# Patient Record
(demographics unavailable — no encounter records)

---

## 2017-08-22 NOTE — DIAGNOSTIC IMAGING REPORT
Indications:  Right ankle trauma, pain



Technique: 3 views right ankle.



Findings:



Comparison: None



Tiny osseous density adjacent to proximal dorsal margin of navicular on lateral

view. No additional Fracture, dislocation, joint space     widening     ,

surrounding soft tissue swelling/foreign body/gas, or other acute changes are

identified. Spurs emanate from the plantar and posterior aspects of calcaneus.



IMPRESSION:



Periventricular tiny osseous density, nonspecific, may represent small avulsion

fracture, acuity indeterminate, or small spur



Otherwise no evidence of acute ankle injury



Calcaneal enthesophytes.

## 2017-08-22 NOTE — EMERGENCY ROOM REPORT
History of Present Illness


General


Chief Complaint:  Lower Extremity Injury


Source:  Patient





Present Illness


HPI


Patient presents with complaints of right ankle pain


She reports that just prior to arrival while walking down stairs at home she 

missed 2 steps and essentially injured her right ankle the pain radiates 

upwards towards the knee


Patient reports that she has'knee problems'from previous however does not feel 

much exacerbation of that area


He mainly complains of the ankle pain


7/10


Mainly on the lateral aspect


Worse with pressure


Allergies:  


Coded Allergies:  


     IBUPROFEN (Unverified  Allergy, Mild, Itching, 7/15/15)


 gi discomfort





Patient History


Past Medical History:  see triage record


Pertinent Family History:  none


Pregnant Now:  No


Reviewed Nursing Documentation:  PMH: Agreed, PSxH: Agreed





Nursing Documentation-PMH


Past Medical History:  No Stated History





Review of Systems


All Other Systems:  negative except mentioned in HPI





Physical Exam





Vital Signs








  Date Time  Temp Pulse Resp B/P Pulse Ox O2 Delivery O2 Flow Rate FiO2


 


8/22/17 14:58 98.1 92 16 120/80 98 Room Air  








Sp02 EP Interpretation:  reviewed, normal


General Appearance:  well appearing, no apparent distress


Head:  normocephalic, atraumatic


Eyes:  bilateral eye EOMI, bilateral eye PERRL


ENT:  normal pharynx


Neck:  supple


Respiratory:  lungs clear


Musculoskeletal:  swelling - To the lateral ankle tender on palpation of the 

malleoli region, knee exam is fairly benign, she has some minimal discomfort on 

palpation laterally, however no obvious effusions ,


Neurologic:  alert, oriented x3, responsive


Skin:  other - as above


Lymphatic:  no adenopathy





Medical Decision Making


Diagnostic Impression:  


 Primary Impression:  


 Ankle sprain


ER Course


Given the history exam and presentation


Patient had imaging studies of pain and no obvious signs of fracture however 

given the discomfort and the swelling a splint was applied





And the patient was provided crutches and will have close outpatient followup


Other X-Ray Diagnostic Results


Other X-Ray Diagnostic Results :  


   # of Views/Limited Vs Complete:  4 View - right ankle


   Indication:  Pain


   EP Interpretation:  Yes


   Interpretation:  no dislocation, no fractures, other - mild soft tissue 

swelling


   Impression:  Other - Soft tissue swelling, no acute fracture


   Interpreting ER Provider:  Thomas Neal DO





Last Vital Signs








  Date Time  Temp Pulse Resp B/P Pulse Ox O2 Delivery O2 Flow Rate FiO2


 


8/22/17 14:58 98.1 92 16 120/80 98 Room Air  








Status:  improved


Disposition:  HOME, SELF-CARE


Condition:  Improved





Additional Instructions:  


Patient is provided with the discharge instructions notified to follow up with 

primary doctor in the next 2-3 days otherwise return to the er with any 

worsening symptoms.


Please note that this report is being documented using DRAGON technology.  This 

can lead to erroneous entry secondary to incorrect interpretation by the 

dictating instrument.











THOMAS NEAL D.O. Aug 22, 2017 15:06

## 2017-10-02 NOTE — EMERGENCY ROOM REPORT
History of Present Illness


General


Chief Complaint:  Skin Rash/Abscess


Source:  Patient





Present Illness


HPI


Is a 29-year-old female with no significant past medical history.  Patient 

presents with a bite to the right wrist.  She was in [place] 2 days ago.

  She woke up in a hotel room with swelling to her right arm.  He was red.  

Unknown bite because she did not see it.  She went to an ER there and had blood 

work are was negative.  Ultrasound was negative.  She was prescribed 

clindamycin.  She did not take any Benadryl.  Complaining of itchiness.  

Swelling down some.  She came here because is not resolved.  Denies any other 

complaint.  No trauma.  No fever chills been no nausea no vomiting.  Worse with 

itching.


Allergies:  


Coded Allergies:  


     No Known Allergies (Unverified , 10/2/17)





Patient History


Past Medical History:  see triage record


Past Surgical History:  other


Pertinent Family History:  none


Social History:  Denies: smoking


Last Menstrual Period:  a month ago


Pregnant Now:  No


Immunizations:  other


Reviewed Nursing Documentation:  PMH: Agreed, PSxH: Agreed





Review of Systems


Eye:  Denies: eye pain, blurred vision


ENT:  Denies: ear pain, nose congestion, throat swelling


Respiratory:  Denies: cough, shortness of breath


Cardiovascular:  Denies: chest pain, palpitations


Gastrointestinal:  Denies: abdominal pain, diarrhea, nausea, vomiting


Musculoskeletal:  Denies: back pain, joint pain


Skin:  Denies: rash


Neurological:  Denies: headache, numbness


Endocrine:  Denies: increased thirst, increased urine


Hematologic/Lymphatic:  Denies: easy bruising


All Other Systems:  negative except mentioned in HPI





Physical Exam





Vital Signs








  Date Time  Temp Pulse Resp B/P (MAP) Pulse Ox O2 Delivery O2 Flow Rate FiO2


 


10/2/17 21:50 98.2 101 20 118/73 98 Room Air  





vitals unremarkable


Sp02 EP Interpretation:  reviewed, normal


General Appearance:  well appearing, no apparent distress, alert


Head:  normocephalic, atraumatic


Eyes:  bilateral eye PERRL, bilateral eye EOMI


ENT:  hearing grossly normal, normal pharynx


Neck:  full range of motion, supple, no meningismus


Respiratory:  chest non-tender, lungs clear, normal breath sounds


Cardiovascular #1:  regular rate, rhythm, no murmur


Gastrointestinal:  normal bowel sounds, non tender, no mass, no organomegaly, 

no bruit, non-distended


Musculoskeletal:  back normal, gait/station normal, normal range of motion, 

other - Right forearm: There look like a small puncture jude on the dorsal 

aspect of the formula the wrist.  There is edema and mild redness to the dorsum 

of the hand.  There is ecchymosis to the volar aspect.  Compartments soft.  

Range of motion the wrist.  Radial pulse 2+.  Full range of motion the elbow.


Neurologic:  alert, oriented x3


Psychiatric:  mood/affect normal


Skin:  warm/dry





Medical Decision Making


Diagnostic Impression:  


 Primary Impression:  


 Venomous bite/sting NEC


ER Course


Patient with local reaction secondary to some type of insect bite.  Local 

reaction.  No evidence of any sepsis or infection.  She is currently taking 

clindamycin here will add Benadryl.  She had ultrasound done which was 

negative.  I review her blood work was also negative.





Last Vital Signs








  Date Time  Temp Pulse Resp B/P (MAP) Pulse Ox O2 Delivery O2 Flow Rate FiO2


 


10/2/17 21:50 98.2 101 20 118/73 98 Room Air  








Status:  unchanged


Disposition:  HOME, SELF-CARE


Condition:  Stable


Scripts


Diphenhydramine Hcl* (BENADRYL*) 25 Mg Capsule


50 MG ORAL Q6H Y for Itching, #30 CAP


   Prov: CHRISTIANO VALENZUELA M.D.         10/2/17





Additional Instructions:  


Elevating arm.  Ice pack to the area.  Followup with your Dr. in 3-5 days for 

recheck.  Return if worse.











CHRISTIANO VALENZUELA M.D. Oct 2, 2017 22:45

## 2018-03-25 NOTE — EMERGENCY ROOM REPORT
History of Present Illness


General


Chief Complaint:  Upper Respiratory Illness


Source:  Patient


 (Jamia Osorio)





Present Illness


HPI


The patient presents with complaints of productive cough, fevers, chills since 

Thursday last week.  Patient states that she feels her symptoms have gotten 

worse she now reports body aches and generally feeling ill.  Patient denies ear 

pain, sore throat, nasal congestion.  Patient reports intermittent runny nose 

states she has taken DayQuil, NyQuil, emergency, she even had a few ampicillin 

that she had left over.  Patient is worried she thinks that she may have gotten 

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 

habitually in a walk-in refrigerator/freezer.  Patient reports a few ill 

contacts at work. but nobody whom works in close proximity with her.  The 

patient reports persistent coughing, feeling short of breath, feeling as though 

it is difficult for her to take a full breath.  Patient denies swelling in the 

lower extremities, recent travel or taking hormonal birth control.  She denies 

claudication. She denies history of asthma however she does report that she 

smokes marijuana twice daily for several years. Pt. reports hx of non-bloody 

diarrhea yesterday. Denies CP, Palpitations, LOC, AMS, dizziness, Changes in 

Vision, Sensation, paresthesias, or a sudden severe headache.


 (Jamia Osorio)


Allergies:  


Coded Allergies:  


     No Known Allergies (Unverified , 10/2/17)





Patient History


Past Medical History:  see triage record


Past Surgical History:  none


Pertinent Family History:  none


Social History:  Reports: smoking - THC, drug use - THC


Last Menstrual Period:  3/11/18


Pregnant Now:  No


:  0


Immunizations:  UTD


Reviewed Nursing Documentation:  PMH: Agreed; PSxH: Agreed (Jamia Osorio)





Nursing Documentation-PMH


Past Medical History:  No History, Except For


 (Jamia Osorio)





Review of Systems


All Other Systems:  negative except mentioned in HPI


 (Jamia Osorio)





Physical Exam





Vital Signs








  Date Time  Temp Pulse Resp B/P (MAP) Pulse Ox O2 Delivery O2 Flow Rate FiO2


 


3/25/18 15:18 99.0 105 20 134/85 95 Room Air  





 99.0       








Sp02 EP Interpretation:  reviewed, normal


General Appearance:  no apparent distress, alert, GCS 15, non-toxic


Head:  normocephalic, atraumatic


ENT:  hearing grossly normal, normal pharynx, normal voice, TMs + canals normal

, uvula midline


Neck:  full range of motion


Respiratory:  lungs clear, normal breath sounds, speaking full sentences, 

wheezing


Cardiovascular #1:  regular rate, rhythm, no edema, normal capillary refill


Gastrointestinal:  normal bowel sounds, non tender, soft


Rectal:  deferred


Genitourinary:  normal inspection


Musculoskeletal:  back normal, gait/station normal, normal range of motion, non-

tender


Neurologic:  alert, oriented x3, responsive, motor strength/tone normal, 

sensory intact, normal gait, speech normal, grossly normal


Psychiatric:  judgement/insight normal


Skin:  normal color, no rash, warm/dry, well hydrated


Lymphatic:  no adenopathy


 (Jamia OsorioAGema)





Medical Decision Making


PA Attestation


Dr. Ching is my supervising Physician whom patient management has been 

discussed with. 


 (Jamia Osorio PGemaAGema)


Diagnostic Impression:  


 Primary Impression:  


 Atypical pneumonia


ER Course


The patient presents with complaints of productive cough, fevers, chills since 

Thursday last week.  Patient states that she feels her symptoms have gotten 

worse she now reports body aches and generally feeling ill.  Patient denies ear 

pain, sore throat, nasal congestion.  Patient reports intermittent runny nose 

states she has taken DayQuil, NyQuil, emergency, she even had a few ampicillin 

that she had left over.  Patient is worried she thinks that she may have gotten 

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 

habitually in a walk-in refrigerator/freezer.  Patient reports a few ill 

contacts at work. but nobody whom works in close proximity with her.  The 

patient reports persistent coughing, feeling short of breath, feeling as though 

it is difficult for her to take a full breath.  Patient denies swelling in the 

lower extremities, recent travel or taking hormonal birth control.  She denies 

claudication. She denies history of asthma however she does report that she 

smokes marijuana twice daily for several years. Pt. reports hx of non-bloody 

diarrhea yesterday. Denies CP, Palpitations, LOC, AMS, dizziness, Changes in 

Vision, Sensation, paresthesias, or a sudden severe headache.





Ddx considered but are not limited to URI, pneumonia, PE, strep pharyngitis, 

meningitis, bronchitis, legionnaire's.





Vital signs: Tachycardic, Pt.is afebrile.  O2 is 95%.





H&PE are most consistent with Bronchitis will treat with antibiotics as patient 

is complaining of productive cough, is a smoker, and also describes working in 

conditions that put her at increased risk for pneumonia caused by bacteria.





ORDERS: none required at this time, the diagnosis is clinical





ED INTERVENTIONS: 





-DuoNebs





-Wheezes have moderately improved and patient states it is much easier for her 

to breathe now.











DISCHARGE: At this time pt. is stable for d/c to home. Will provide printed 

patient care instructions, and any necessary prescriptions. Care plan and 

follow up instructions have been discussed with the patient prior to discharge.


 (Jamia Osorio)





Chest X-Ray Diagnostic Results


Chest X-Ray Diagnostic Results :  


   Chest X-Ray Ordered:  Yes


   # of Views/Limited/Complete:  1 View


   Indication:  Shortness of Breath


   EP Interpretation:  Yes


   PA Xray:  Interpretation reviewed, by supervising MD, and agrees with 

findings.


   Interpretation:  no consolidation, no effusion, no pneumothorax, no acute 

cardiopulmonary disease


   Impression:  No acute disease


   Electronically Signed by:  Jamia Osorio PA-C


 (Jamia Osorio)


Chest X-Ray Diagnostic Results :  


   Electronically Signed by:  Romelia documentation reviewed by me and is 

accurate, Adams Ching MD.


 (Adams Ching M.D.)





Last Vital Signs








  Date Time  Temp Pulse Resp B/P (MAP) Pulse Ox O2 Delivery O2 Flow Rate FiO2


 


3/25/18 15:18 99.0 105 20 134/85 95 Room Air  





 99.0       








 (Jamia Osorio P.AGema)





Last Vital Signs








  Date Time  Temp Pulse Resp B/P (MAP) Pulse Ox O2 Delivery O2 Flow Rate FiO2


 


3/25/18 17:31 99.0 99 20 128/79 99 Room Air  





 99.0       


 


3/25/18 17:06        21








Status:  improved


 (Adams Ching M.D.)


Disposition:  HOME, SELF-CARE


Condition:  Stable


Scripts


Albuterol Sulfate* (ALBUTEROL SULFATE MDI*) 8.5 Gm Hfa.aer.ad


2 PUFF INH Q4H, #1 INH 0 Refills


   Prov: Jamia Osorio.A.         3/25/18 


Guaifenesin (Guaifenesin) 1,200 Mg Tab.er.12h


1200 MG PO BID, #20 TAB


   Prov: Jamia Osorio.A.         3/25/18 


Azithromycin* (ZITHROMAX*) 250 Mg Tablet


250 MG ORAL DAILY, #6 TAB


   Prov: Jamia Osorio.A.         3/25/18 


Codeine/Promethazine Hcl* (PROMETHAZINE-CODEINE SYRUP*) 118 Ml Syrup


5 ML ORAL Q6H PRN for For Cough, #120 ML 0 Refills


   Prov: Jamia Osorio.A.         3/25/18


Departure Forms:  Return to Work      Return to Work Date:  Mar 28, 2018


   Work Restrictions:  None


      Return to Full Activity:  Mar 28, 2018


Patient Instructions:  Upper Respiratory Infection, Adult





Additional Instructions:  


Take medications as directed. 


 ** Follow up with a Primary Care Provider in 3-5 days, even if your symptoms 

have resolved. ** 


--Please review list of primary care clinics, if you do not already have a 

primary care provider





Return sooner to ED if new symptoms occur, or current symptoms become worse. 


Do not drink alcohol, drive, or operate heavy machinery while taking Cough 

Syrup as this may cause drowsiness. 











- Please note that this Emergency Department Report was dictated using dianboom technology software, occasionally this can lead to 

erroneous entry secondary to interpretation by the dictation equipment.











Jamia Osorio Mar 25, 2018 15:36


Adams Ching M.D. Mar 26, 2018 06:07

## 2018-03-26 NOTE — DIAGNOSTIC IMAGING REPORT
Indication: Chest pain

 

Technique: One view of the chest

 

Comparison: none

 

Findings: Lungs and pleural spaces are clear. Heart size is normal

 

Impression: No acute process

## 2018-05-08 NOTE — EMERGENCY ROOM REPORT
History of Present Illness


General


Chief Complaint:  Female Urogenital Problems


Source:  Patient





Present Illness


HPI


Patient presents with right flank and right suprapubic pain.  She feels it 

might be her ovary and it also could be that she has a retained tampon.  She 

had sex at the end of her period.  She's never had pain like this before.  She 

denies any dysuria.  Was bothering her when she was at work today.  She 

declines any pain medications this time.  She denies any significant discharge.

  Pain is rated 7/10, aching, not radiating and worse with twisting her torso 

and lifting.





No fevers, chills, chest pain, NVD, dysuria, rashes, joint pain, headache.


Allergies:  


Coded Allergies:  


     No Known Allergies (Unverified , 10/2/17)





Patient History


Past Medical History:  see triage record


Social History:  Reports: smoking


Social History Narrative


works at [employer]


Last Menstrual Period:  18


Pregnant Now:  No


:  0


Para:  0


Reviewed Nursing Documentation:  PMH: Agreed; PSxH: Agreed





Nursing Documentation-PMH


Past Medical History:  No History, Except For





Physical Exam





Vital Signs








  Date Time  Temp Pulse Resp B/P (MAP) Pulse Ox O2 Delivery O2 Flow Rate FiO2


 


18 23:22 98.6 81 16 150/95 98 Room Air  





 98.6       








Sp02 EP Interpretation:  reviewed, normal


General Appearance:  well appearing, no apparent distress, GCS 15


Head:  normocephalic


Eyes:  bilateral eye normal inspection


ENT:  moist mucus membranes


Neck:  supple


Respiratory:  lungs clear, normal breath sounds


Cardiovascular #1:  regular rate, rhythm


Cardiovascular #2:  2+ radial (R)


Gastrointestinal:  normal inspection, normal bowel sounds, non tender, no mass, 

non-distended


Genitourinary:  normal inspection, cervix normal, ext genitalia/vag normal, os 

closed, uterus normal, other - no CMT, uterus normal size.  Minimal R adnexal 

disvomfort.  Unable to feel ovary


Musculoskeletal:  back normal, gait/station normal, normal range of motion


Neurologic:  alert, oriented x3, grossly normal


Psychiatric:  mood/affect normal


Skin:  normal inspection, warm/dry





Medical Decision Making


Diagnostic Impression:  


 Primary Impression:  


 Flank pain


ER Course


Patient with R flank and suprapubic discomfort.  DDx: muscle strain, ovarian 

cyst, ectopic, fibroid, PID, UTI, renal stone amongst others.  She declines 

pain medicine.  Evaluation with labs and UA.   Exam against PID.





UA clear, preg neg, yeast on WM.





Patient given tylenol.  Discussed need for follow up with OB and consideration 

for ultrasound.  Told she is being observed as outpatient and to return if 

worsened pain.





Patient stable for outpatient observation and treatment.





Laboratory Tests








Test


  18


23:00


 


Urine Color Yellow  


 


Urine Appearance Clear  


 


Urine pH 5 (4.5-8.0)  


 


Urine Specific Gravity


  1.025


(1.005-1.035)


 


Urine Protein


  1+ (NEGATIVE)


H


 


Urine Glucose (UA)


  Negative


(NEGATIVE)


 


Urine Ketones


  Negative


(NEGATIVE)


 


Urine Occult Blood


  1+ (NEGATIVE)


H


 


Urine Nitrite


  Negative


(NEGATIVE)


 


Urine Bilirubin


  Negative


(NEGATIVE)


 


Urine Urobilinogen


  Normal MG/DL


(0.0-1.0)


 


Urine Leukocyte Esterase


  1+ (NEGATIVE)


H


 


Urine RBC


  0-2 /HPF (0 -


2)


 


Urine WBC


  0-2 /HPF (0 -


2)


 


Urine Squamous Epithelial


Cells Moderate /LPF


(NONE/OCC)  H


 


Urine Bacteria


  Few /HPF


(NONE)


 


Urine HCG, Qualitative


  Negative


(NEGATIVE)








Microbiology








 Date/Time


Source Procedure


Growth Status


 


 


 18 00:45


Vaginal Wet Prep - Final Complete











Last Vital Signs








  Date Time  Temp Pulse Resp B/P (MAP) Pulse Ox O2 Delivery O2 Flow Rate FiO2


 


18 02:20 98.6 88 16 150/95 98 Room Air  





 209.5       








Status:  improved


Disposition:  HOME, SELF-CARE


Condition:  Improved


Scripts


Clotrimazole (GYNE-LOTRIMIN*) 45 Gm Cream.appl


1 APPLIC VG QHS, #45 GM 0 Refills


   Prov: Adams Ching M.D.         18 


Ibuprofen* (MOTRIN*) 600 Mg Tablet


600 MG ORAL Q6H PRN for For Pain, #16 TAB


   Prov: Adams Ching M.D.         18


Referrals:  


[facility],REFERRING (PCP)











Adams Ching M.D. May 8, 2018 01:54